# Patient Record
Sex: MALE | Race: WHITE | NOT HISPANIC OR LATINO | Employment: OTHER | URBAN - METROPOLITAN AREA
[De-identification: names, ages, dates, MRNs, and addresses within clinical notes are randomized per-mention and may not be internally consistent; named-entity substitution may affect disease eponyms.]

---

## 2021-02-22 ENCOUNTER — OFFICE VISIT (OUTPATIENT)
Dept: ORTHOPEDIC SURGERY | Facility: CLINIC | Age: 51
End: 2021-02-22

## 2021-02-22 VITALS — HEIGHT: 74 IN | WEIGHT: 315 LBS | HEART RATE: 69 BPM | BODY MASS INDEX: 40.43 KG/M2 | OXYGEN SATURATION: 98 %

## 2021-02-22 DIAGNOSIS — M75.32 CALCIFIC TENDINITIS OF LEFT SHOULDER: ICD-10-CM

## 2021-02-22 DIAGNOSIS — M25.512 LEFT SHOULDER PAIN, UNSPECIFIED CHRONICITY: Primary | ICD-10-CM

## 2021-02-22 DIAGNOSIS — M75.82 ROTATOR CUFF TENDINITIS, LEFT: ICD-10-CM

## 2021-02-22 DIAGNOSIS — E11.9 TYPE 2 DIABETES MELLITUS WITHOUT COMPLICATION, WITHOUT LONG-TERM CURRENT USE OF INSULIN (HCC): ICD-10-CM

## 2021-02-22 PROCEDURE — 99203 OFFICE O/P NEW LOW 30 MIN: CPT | Performed by: PHYSICIAN ASSISTANT

## 2021-02-22 PROCEDURE — 20610 DRAIN/INJ JOINT/BURSA W/O US: CPT | Performed by: PHYSICIAN ASSISTANT

## 2021-02-22 RX ORDER — METOPROLOL SUCCINATE 25 MG/1
TABLET, EXTENDED RELEASE ORAL
COMMUNITY
Start: 2021-02-02

## 2021-02-22 RX ORDER — LEVOTHYROXINE SODIUM 0.03 MG/1
TABLET ORAL
COMMUNITY
Start: 2021-02-02

## 2021-02-22 RX ORDER — LEVOTHYROXINE SODIUM 0.2 MG/1
TABLET ORAL
COMMUNITY
Start: 2021-02-02

## 2021-02-22 RX ORDER — INSULIN DEGLUDEC 200 U/ML
INJECTION, SOLUTION SUBCUTANEOUS
COMMUNITY
Start: 2021-02-10

## 2021-02-22 RX ORDER — ERGOCALCIFEROL 1.25 MG/1
CAPSULE ORAL
COMMUNITY
Start: 2021-02-10

## 2021-02-22 RX ORDER — SERTRALINE HYDROCHLORIDE 100 MG/1
TABLET, FILM COATED ORAL
COMMUNITY
Start: 2021-02-02

## 2021-02-22 RX ORDER — CYCLOBENZAPRINE HCL 10 MG
TABLET ORAL
COMMUNITY
Start: 2021-02-18

## 2021-02-22 RX ORDER — OMEPRAZOLE 40 MG/1
CAPSULE, DELAYED RELEASE ORAL
COMMUNITY
Start: 2021-02-02

## 2021-02-22 RX ADMIN — LIDOCAINE HYDROCHLORIDE 4 ML: 10 INJECTION, SOLUTION EPIDURAL; INFILTRATION; INTRACAUDAL; PERINEURAL at 08:40

## 2021-02-22 RX ADMIN — TRIAMCINOLONE ACETONIDE 40 MG: 40 INJECTION, SUSPENSION INTRA-ARTICULAR; INTRAMUSCULAR at 08:40

## 2021-02-22 NOTE — PROGRESS NOTES
[facility] Note    Subjective     Chief Complaint   Patient presents with   • Left Shoulder - Pain        HPI  José Manuel Paige is a 50 y.o. male.  Left-hand-dominant.  Patient presents for evaluation of his left shoulder.  No reported specific recent history of injury or trauma but he is noted increasing pain over the last 6 weeks.  He reports being run over by a cows last year but never had any significant pain to the shoulder following this.    Currently he endorses a pain scale of 5/10.  Severity the pain moderate.  Quality pain aching.  Pain causing problems with regards to sleep.  He has difficulty with reaching and lifting activities.  No reported numbness or tingling into the extremity or digits.  No treatment today.    Patient is a diabetic.    Patient denies any fever, chills, night sweats or other constitutional symptoms.    Past Medical History:   Diagnosis Date   • Acid reflux    • Depression    • Diabetes (CMS/HCC)    • Hypertension    • Thyroid disease       Past Surgical History:   Procedure Laterality Date   • GASTRIC SLEEVE LAPAROSCOPIC     • HERNIA REPAIR        Family History   Problem Relation Age of Onset   • Diabetes Mother    • Heart attack Mother    • No Known Problems Father      Social History     Socioeconomic History   • Marital status:      Spouse name: Not on file   • Number of children: Not on file   • Years of education: Not on file   • Highest education level: Not on file   Tobacco Use   • Smoking status: Never Smoker   • Smokeless tobacco: Never Used   Substance and Sexual Activity   • Alcohol use: Not Currently   • Drug use: Never   • Sexual activity: Defer      Current Outpatient Medications on File Prior to Visit   Medication Sig Dispense Refill   • cyclobenzaprine (FLEXERIL) 10 MG tablet      • levothyroxine (SYNTHROID, LEVOTHROID) 200 MCG tablet      • levothyroxine (SYNTHROID, LEVOTHROID) 25 MCG tablet      • metFORMIN (GLUCOPHAGE) 1000 MG tablet  "     • metoprolol succinate XL (TOPROL-XL) 25 MG 24 hr tablet      • omeprazole (priLOSEC) 40 MG capsule      • sertraline (ZOLOFT) 100 MG tablet      • Tresiba FlexTouch 200 UNIT/ML solution pen-injector pen injection      • vitamin D (ERGOCALCIFEROL) 1.25 MG (89407 UT) capsule capsule        No current facility-administered medications on file prior to visit.       Allergies   Allergen Reactions   • Penicillins Hives        The following portions of the patient's history were reviewed and updated as appropriate: allergies, current medications, past family history, past medical history, past social history, past surgical history and problem list.    Review of Systems   Constitutional: Negative.    HENT: Negative.    Eyes: Negative.    Respiratory: Negative.    Cardiovascular: Negative.    Gastrointestinal: Negative.    Endocrine: Negative.    Genitourinary: Negative.    Musculoskeletal: Positive for arthralgias, back pain, joint swelling and neck stiffness.   Skin: Negative.    Allergic/Immunologic: Negative.    Neurological: Negative.    Hematological: Negative.    Psychiatric/Behavioral: Negative.         Objective      Physical Exam  Pulse 69   Ht 188 cm (74\")   Wt (!) 144 kg (317 lb)   SpO2 98%   BMI 40.70 kg/m²     Body mass index is 40.7 kg/m².    GENERAL APPEARANCE: awake, alert & oriented x 3, in no acute distress and well developed, well nourished  PSYCH: normal mood and affect  LUNGS:  breathing nonlabored, no wheezing  EYES: sclera anicteric, pupils equal  CARDIOVASCULAR: palpable pulses. Capillary refill less than 2 seconds  INTEGUMENTARY: skin intact, no clubbing, cyanosis  NEUROLOGIC:  Normal Sensation         Ortho Exam  Musculoskeletal   Upper Extremity   Left Shoulder     Inspection and Palpation:     Tenderness -positive tenderness anterior lateral aspect of the shoulder.  Patient also has tenderness noted to the ACJ.    Crepitus - none    Sensation is normal    Examination reveals no " ecchymosis.      Strength and Tone:    Supraspinatus - 4/5    External Rotators-5/5    Infraspinatus - 5/5    Subscapularis - 4+/5    Deltoid - 5/5     Range of Motion   Right shoulder:    Internal Rotation: ROM - T10    External Rotation: AROM - 80 degrees    Elevation through flexion: AROM - 180 degrees    Left Shoulder:    Internal Rotation: ROM - T12    External Rotation: AROM - 65 degrees    Elevation through flexion: AROM - 160 degrees      Instability   Left shoulder    Sulcus sign negative    Apprehension test negative    Yesenia relocation test negative    Jerk test negative     Impingement   Left shoulder    Pena-Dale impingement test positive    Neer impingement test positive     Functional Testing   Left shoulder    AC crossover adduction test mild discomfort    Abdominal compression test mild discomfort    Lift-off sign negative    Speed's test negative    Sharma's test negative    Hornblower's sign negative       Imaging/Studies  Ordered left shoulder plain films.  Imaging read by Dr. Nieves.    Imaging Results (Last 7 Days)     Procedure Component Value Units Date/Time    XR Shoulder 2+ View Left [[id number]] Resulted: 02/22/21 0836     Updated: 02/22/21 0837    Narrative:      Left Shoulder X-Ray  Indication: Pain  AP, scapular Y, and axillary lateral views    Findings: Calcific tissue in the supraspinatus insertion area of the   greater tuberosity  No fracture  Normal joint spaces    No prior studies were available for comparison.        Laboratory data  Patient states he has an A1c of 9-10.  I do not have official results in his chart or paperwork that he brought with him.  Assessment/Plan        ICD-10-CM ICD-9-CM   1. Left shoulder pain, unspecified chronicity  M25.512 719.41   2. Rotator cuff tendinitis, left  M75.82 726.10   3. Calcific tendinitis of left shoulder  M75.32 726.11   4. Type 2 diabetes mellitus without complication, without long-term current use of insulin (CMS/[facility])  E11.9  250.00       Orders Placed This Encounter   Procedures   • Large Joint Arthrocentesis: L subacromial bursa   • XR Shoulder 2+ View Left        -Left shoulder pain due to rotator cuff tendinitis/calcific tendinitis.  -Patient was offered and accepted corticosteroid injection to the subacromial space.  Injection was given today.  -Discussed referral to formal PT but he states he is unable to go.  He was provided home shoulder exercises.  -Type II diabetic--patient needs to work with PCP regarding better control/tighter control of his diabetes as he reports his A1c is 9-10.  -Recommend over-the-counter pain medication as needed.  -Follow-up in 6 weeks for repeat evaluation.  -Questions and concerns answered.    After discussing the risks, benefits, indications of injection, the patient gave consent to proceed.  Left shoulder subacromial space was confirmed as the correct site to be injected with a timeout.  It was then prepped using Hibiclens and injected with a mixture of 4 cc of 1% plain lidocaine and 1 cc of Kenalog (40 mg per mL), without any resistance through the posterior lateral approach, patient in seated position.  Area was cleaned, hemostasis was achieved and a Band-Aid was applied over the injection site.  The patient tolerated procedure well.  I instructed the patient on signs and symptoms of infection.  They should report to the emergency department or return to clinic if any of these develop, for further evaluation and treatment.  Recommended modifying activity for the next 48 hours to include rest, ice, elevation and oral pain medication as needed.  Postinjection pain control as well as blood sugar monitoring were discussed with the patient.    History, exam and imaging all discussed with Dr. Nieves who agrees with the above assessment and plan.    Medical Decision Making  Management Options : over-the-counter medicine and prescription/IM medicine  Data/Risk: radiology tests       Lisa Pandya,  MARGOTH  02/26/21  08:41 EST         EMR Dragon/Transcription disclaimer:  Much of this encounter note is an electronic transcription of spoken language to printed text. Electronic transcription of spoken language may permit erroneous, or at times, nonsensical words or phrases to be inadvertently transcribed. Although I have reviewed the note for such errors, some may still exist.

## 2021-02-22 NOTE — PROGRESS NOTES
Procedure   Large Joint Arthrocentesis: L subacromial bursa  Date/Time: 2/22/2021 8:40 AM  Consent given by: patient  Site marked: site marked  Timeout: Immediately prior to procedure a time out was called to verify the correct patient, procedure, equipment, support staff and site/side marked as required   Supporting Documentation  Indications: pain   Procedure Details  Location: shoulder - L subacromial bursa  Preparation: Patient was prepped and draped in the usual sterile fashion  Needle size: 25 G  Approach: anterior  Medications administered: 40 mg triamcinolone acetonide 40 MG/ML; 4 mL lidocaine PF 1% 1 %  Patient tolerance: patient tolerated the procedure well with no immediate complications

## 2021-02-26 RX ORDER — TRIAMCINOLONE ACETONIDE 40 MG/ML
40 INJECTION, SUSPENSION INTRA-ARTICULAR; INTRAMUSCULAR
Status: COMPLETED | OUTPATIENT
Start: 2021-02-22 | End: 2021-02-22

## 2021-02-26 RX ORDER — LIDOCAINE HYDROCHLORIDE 10 MG/ML
4 INJECTION, SOLUTION EPIDURAL; INFILTRATION; INTRACAUDAL; PERINEURAL
Status: COMPLETED | OUTPATIENT
Start: 2021-02-22 | End: 2021-02-22

## 2021-04-08 ENCOUNTER — OFFICE VISIT (OUTPATIENT)
Dept: ORTHOPEDIC SURGERY | Facility: CLINIC | Age: 51
End: 2021-04-08

## 2021-04-08 VITALS
BODY MASS INDEX: 40.43 KG/M2 | HEART RATE: 69 BPM | WEIGHT: 315 LBS | SYSTOLIC BLOOD PRESSURE: 150 MMHG | DIASTOLIC BLOOD PRESSURE: 81 MMHG | HEIGHT: 74 IN

## 2021-04-08 DIAGNOSIS — E66.01 CLASS 3 SEVERE OBESITY WITH BODY MASS INDEX (BMI) OF 40.0 TO 44.9 IN ADULT, UNSPECIFIED OBESITY TYPE, UNSPECIFIED WHETHER SERIOUS COMORBIDITY PRESENT (HCC): ICD-10-CM

## 2021-04-08 DIAGNOSIS — E11.9 TYPE 2 DIABETES MELLITUS WITHOUT COMPLICATION, WITHOUT LONG-TERM CURRENT USE OF INSULIN (HCC): ICD-10-CM

## 2021-04-08 DIAGNOSIS — M75.82 ROTATOR CUFF TENDINITIS, LEFT: Primary | ICD-10-CM

## 2021-04-08 DIAGNOSIS — M75.32 CALCIFIC TENDINITIS OF LEFT SHOULDER: ICD-10-CM

## 2021-04-08 PROCEDURE — 99213 OFFICE O/P EST LOW 20 MIN: CPT | Performed by: PHYSICIAN ASSISTANT

## 2021-04-08 NOTE — PROGRESS NOTES
"    [facility] Orthopaedic Surgery Clinic Note        Subjective     CC: Follow-up (6 weeks- Left shoulder pain)      SHERYL Paige is a 51 y.o. male.  Established patient returns for follow-up left shoulder following corticosteroid injection 2/22/2021 to the subacromial space.  He reports improvement of symptoms.  He states he is able to sleep on his left side now.  With the injection he believes he got approximately 80% of pain relief.    Currently he endorses a pain scale of 3/10.  He reports he has been doing his home exercise shoulder therapy, intermittently.  He does have a history of diabetes and states he just had lab work and he believes his A1c is around 8.??.    Overall, patient's symptoms are improved.  He states that this time the pain is tolerable.    Additionally patient does have a BMI of 40.43.  He has had a history of gastric sleeve 4 to 5 years ago and reports that he has lost a total of 263 pounds since having that surgery performed.  He is continue to work on weight loss.    ROS:    Constiutional:Pt denies fever, chills, nausea, or vomiting.  MSK:as above        Objective      Past Medical History  Past Medical History:   Diagnosis Date   • Acid reflux    • Depression    • Diabetes (CMS/HCC)    • Hypertension    • Thyroid disease          Physical Exam  /81   Pulse 69   Ht 188 cm (74.02\")   Wt (!) 143 kg (315 lb)   BMI 40.43 kg/m²     Body mass index is 40.43 kg/m².    Patient is well nourished and well developed.        Ortho Exam  Peripheral Vascular   Left Upper Extremity    No cyanotic nail beds    Pink nail beds and rapid capillary refill   Palpation    Radial Pulse - Bilaterally normal    Musculoskeletal   Upper Extremity   Left Shoulder     Inspection and Palpation:    Sensation is normal     Strength and Tone:    Supraspinatus - 4/5    Infraspinatus - 5/5    Biceps - 5/5    Subscapularis-4+/5        Patient continues to have tenderness especially to the lateral aspect of the " shoulder.      AC joint tenderness:  Mild      Range of Motion   Left Shoulder:    Internal Rotation: ROM - T12    External Rotation: AROM - 65 degrees    Elevation through flexion: AROM - 170-180 degrees      Impingement   Left shoulder    Pena-Dale impingement test mild discomfort    Neer impingement test mild discomfort   Functional Testing   Left shoulder    Abdominal compression test mild discomfort    Speed's test negative    Sharma's test negative      Imaging/Labs/EMG Reviewed:  No new imaging today.      Assessment:  1. Rotator cuff tendinitis, left    2. Calcific tendinitis of left shoulder    3. Type 2 diabetes mellitus without complication, without long-term current use of insulin (CMS/[facility])    4. Class 3 severe obesity with body mass index (BMI) of 40.0 to 44.9 in adult, unspecified obesity type, unspecified whether serious comorbidity present (CMS/[facility])        Plan:  1. Left shoulder rotator cuff tendinitis with calcific tendinitis--is too soon to repeat corticosteroid injection but patient states that pain that he is having at this time is tolerable and he does not require repeat injection.  2. He is still uninterested in proceeding with formal PT.  He will continue with his home shoulder exercises for range of motion, stretching and strengthening.  If he changes his mind regarding formal PT referral he can contact the clinic.  3. Recommend over-the-counter pain medication as needed.  4. Diabetes--patient reports most recent A1c was a little over 8.0.  He needs to continue working with his PCP on tighter glucose control.  5. Obesity--patient has a BMI of 40.43.  Patient has an elevated BMI. The patient has been instructed on various weight loss avenues including diet, portion control, calorie restriction, low/no impact exercise, referral to weight loss management and/or bariatric surgery (which he is undergone gastric sleeve for 5 years ago).  It was explained that weight loss can improve joint  pain alone by decreasing the joint reaction forces.  For every pound of weight change, the knee and hip joints see a 4 to 5 fold change in pressure.  We will continue working on weight loss on his own with portion control.    6. Follow-up in 3 months for repeat evaluation, sooner if issues arise or symptoms worsen/change.  Plan on how he is doing we could possibly repeat the corticosteroid injection to the subacromial space versus injecting a different location in the shoulder if it is more symptomatic.  We also discussed proceeding with MRI for further evaluation.  7. Questions and concerns answered.      Lisa Pandya PA-C  04/08/21  08:24 MADHURIT      Dragon disclaimer:  Much of this encounter note is an electronic transcription/translation of spoken language to printed text. The electronic translation of spoken language may permit erroneous, or at times, nonsensical words or phrases to be inadvertently transcribed; Although I have reviewed the note for such errors, some may still exist.

## 2021-04-12 ENCOUNTER — TELEPHONE (OUTPATIENT)
Dept: ORTHOPEDIC SURGERY | Facility: CLINIC | Age: 51
End: 2021-04-12

## 2021-04-12 NOTE — TELEPHONE ENCOUNTER
Best call back number: [phone]    How would you like to receive the form or medical records (pick-up, mail, fax): FAX  If fax, what is the fax number: [phone]    Additional notes: PLEASE FAX OFFICE NOTES FROM 4/8 TO THIS NUMBER ABOVE

## 2024-04-17 ENCOUNTER — HOSPITAL ENCOUNTER (OUTPATIENT)
Age: 54
End: 2024-04-17
Payer: MEDICARE

## 2024-04-17 VITALS
DIASTOLIC BLOOD PRESSURE: 75 MMHG | HEART RATE: 65 BPM | SYSTOLIC BLOOD PRESSURE: 129 MMHG | OXYGEN SATURATION: 94 % | RESPIRATION RATE: 18 BRPM

## 2024-04-17 VITALS — BODY MASS INDEX: 41.5 KG/M2

## 2024-04-17 DIAGNOSIS — M79.605: ICD-10-CM

## 2024-04-17 DIAGNOSIS — M54.50: Primary | ICD-10-CM

## 2024-04-17 DIAGNOSIS — M54.16: ICD-10-CM

## 2024-04-17 DIAGNOSIS — G89.4: ICD-10-CM

## 2024-04-17 PROCEDURE — 72110 X-RAY EXAM L-2 SPINE 4/>VWS: CPT

## 2024-04-17 PROCEDURE — 99202 OFFICE O/P NEW SF 15 MIN: CPT

## 2024-04-17 PROCEDURE — G0463 HOSPITAL OUTPT CLINIC VISIT: HCPCS

## 2024-05-01 ENCOUNTER — HOSPITAL ENCOUNTER (OUTPATIENT)
Age: 54
Discharge: HOME | End: 2024-05-01
Payer: MEDICARE

## 2024-05-01 DIAGNOSIS — M54.50: Primary | ICD-10-CM

## 2024-05-01 PROCEDURE — 76376 3D RENDER W/INTRP POSTPROCES: CPT

## 2024-05-01 PROCEDURE — 72148 MRI LUMBAR SPINE W/O DYE: CPT

## 2024-05-16 ENCOUNTER — HOSPITAL ENCOUNTER (OUTPATIENT)
Dept: HOSPITAL 22 - SC.PAIN | Age: 54
Discharge: HOME | End: 2024-05-16
Payer: MEDICARE

## 2024-05-16 VITALS
DIASTOLIC BLOOD PRESSURE: 54 MMHG | HEART RATE: 61 BPM | RESPIRATION RATE: 18 BRPM | SYSTOLIC BLOOD PRESSURE: 115 MMHG | OXYGEN SATURATION: 98 %

## 2024-05-16 VITALS — BODY MASS INDEX: 43.6 KG/M2

## 2024-05-16 DIAGNOSIS — M51.16: Primary | ICD-10-CM

## 2024-05-16 DIAGNOSIS — M79.605: ICD-10-CM

## 2024-05-16 PROCEDURE — G0463 HOSPITAL OUTPT CLINIC VISIT: HCPCS

## 2024-05-16 PROCEDURE — 99212 OFFICE O/P EST SF 10 MIN: CPT

## 2024-06-04 ENCOUNTER — HOSPITAL ENCOUNTER (OUTPATIENT)
Age: 54
Discharge: HOME | End: 2024-06-04
Payer: MEDICARE

## 2024-06-04 VITALS
SYSTOLIC BLOOD PRESSURE: 104 MMHG | TEMPERATURE: 98.06 F | OXYGEN SATURATION: 97 % | DIASTOLIC BLOOD PRESSURE: 60 MMHG | HEART RATE: 67 BPM | RESPIRATION RATE: 18 BRPM

## 2024-06-04 VITALS
HEART RATE: 74 BPM | RESPIRATION RATE: 18 BRPM | DIASTOLIC BLOOD PRESSURE: 57 MMHG | SYSTOLIC BLOOD PRESSURE: 131 MMHG | OXYGEN SATURATION: 93 %

## 2024-06-04 VITALS — BODY MASS INDEX: 41.5 KG/M2

## 2024-06-04 VITALS
DIASTOLIC BLOOD PRESSURE: 78 MMHG | HEART RATE: 74 BPM | SYSTOLIC BLOOD PRESSURE: 131 MMHG | RESPIRATION RATE: 18 BRPM | OXYGEN SATURATION: 93 %

## 2024-06-04 DIAGNOSIS — M51.26: ICD-10-CM

## 2024-06-04 DIAGNOSIS — M51.16: Primary | ICD-10-CM

## 2024-06-04 PROCEDURE — 64483 NJX AA&/STRD TFRM EPI L/S 1: CPT

## 2024-06-19 ENCOUNTER — HOSPITAL ENCOUNTER (OUTPATIENT)
Dept: HOSPITAL 22 - SC.PAIN | Age: 54
Discharge: HOME | End: 2024-06-19
Payer: MEDICARE

## 2024-06-19 VITALS
OXYGEN SATURATION: 95 % | HEART RATE: 61 BPM | DIASTOLIC BLOOD PRESSURE: 64 MMHG | RESPIRATION RATE: 18 BRPM | SYSTOLIC BLOOD PRESSURE: 124 MMHG

## 2024-06-19 VITALS — BODY MASS INDEX: 41.5 KG/M2

## 2024-06-19 DIAGNOSIS — M51.16: Primary | ICD-10-CM

## 2024-06-19 DIAGNOSIS — M79.605: ICD-10-CM

## 2024-06-19 PROCEDURE — 99212 OFFICE O/P EST SF 10 MIN: CPT

## 2024-06-19 PROCEDURE — G0463 HOSPITAL OUTPT CLINIC VISIT: HCPCS

## 2024-08-19 ENCOUNTER — HOSPITAL ENCOUNTER (OUTPATIENT)
Age: 54
Discharge: HOME | End: 2024-08-19
Payer: MEDICARE

## 2024-08-19 VITALS — BODY MASS INDEX: 41.7 KG/M2

## 2024-08-19 VITALS
SYSTOLIC BLOOD PRESSURE: 139 MMHG | OXYGEN SATURATION: 97 % | DIASTOLIC BLOOD PRESSURE: 70 MMHG | HEART RATE: 72 BPM | RESPIRATION RATE: 18 BRPM

## 2024-08-19 DIAGNOSIS — I25.10: ICD-10-CM

## 2024-08-19 DIAGNOSIS — Z79.899: ICD-10-CM

## 2024-08-19 DIAGNOSIS — M54.50: ICD-10-CM

## 2024-08-19 DIAGNOSIS — M51.16: ICD-10-CM

## 2024-08-19 DIAGNOSIS — G89.29: ICD-10-CM

## 2024-08-19 DIAGNOSIS — I10: ICD-10-CM

## 2024-08-19 PROCEDURE — G0463 HOSPITAL OUTPT CLINIC VISIT: HCPCS

## 2024-08-19 PROCEDURE — 99212 OFFICE O/P EST SF 10 MIN: CPT

## 2025-02-21 NOTE — PROGRESS NOTES
Chief Complaint  Establish care for sleep apnea    Subjective     History of Present Illness:  José Manuel Paige is a 54 y.o. male with hypertension, diabetes mellitus, thyroid disease, and depression.  The patient is referred by Liya Mcadams MD with primary care.  Patient presents to establish care for JAEL on PAP therapy.  Review of self-reported questionnaire notes symptoms including snoring, witnessed apnea, daytime sleepiness and fatigue, frequent awakening, morning headaches, nonrestorative sleep, heartburn/reflux, dry mouth, sore throat, and memory loss.  He typically goes to bed at 8 PM waking at 4 AM on weekdays and weekends.  He denies use of tobacco, alcohol, or illicit/recreational drugs.  Patient's significant other reports witnessed episodes of apnea which occur occasionally.  Additionally, the patient is observed with heavy snoring which occurs continuously, nightly, and in all sleeping positions.     He has been having difficulty with his mask leaking. He had tried several masks but continues to have issues. He has had dry mouth and throat.  Patient inquires about the inspire device.    DME: [facility]    Further details are as follows:    Bellwood Scale is (out of 24): Total score: 13     Estimated average amount of sleep per night: 8 hours  Number of times he wakes up at night: 5-6 times  Difficulty falling back asleep: occasionally  It usually takes 0 minutes to go to sleep.  He feels sleepy upon waking up: yes  Rotating or night shift work: no    Drowsiness/Sleepiness:  He exhibits the following:  excessive daytime sleepiness  excessive daytime fatigue  falls asleep watching TV  difficulty driving due to sleepiness  takes scheduled naps during the day    Snoring/Breathing:  He exhibits the following:  loud snoring, quits breathing at night, awakens with dry mouth, and sore throat when waking up in the morning    Head Injury:  He exhibits the following:  No    Reflux:  He describes the following:  Not  waking with     Narcolepsy:  He exhibits the following:  none    RLS/PLMs:  He describes the following:  discomfort in legs with an urge to move them    Insomnia:  He describes the following:  frequent awakenings  bothered by pain at night    Parasomnia:  He exhibits the following:  sleep talks  excessive sweating while sleeping    Weight:       02/26/25 0824   Weight: (!) 152 kg (334 lb 11.2 oz)      Weight change in the last year:  gain: 0 lbs    The patient's relevant past medical, surgical, family, and social history reviewed and updated in Epic as appropriate.    Review of Systems   Constitutional: Negative.    HENT:  Positive for nosebleeds and sore throat.    Eyes: Negative.    Respiratory: Negative.     Cardiovascular: Negative.    Gastrointestinal: Negative.    Endocrine: Negative.    Genitourinary: Negative.    Musculoskeletal:  Positive for back pain, myalgias and neck stiffness.   Skin:  Positive for rash.   Allergic/Immunologic: Negative.    Neurological:  Positive for dizziness, tremors, light-headedness and headache.   Hematological:  Bruises/bleeds easily.   Psychiatric/Behavioral: Negative.     All other systems reviewed and are negative.      PMH:    Past Medical History:   Diagnosis Date    Acid reflux     Depression     Diabetes     Hypertension     Thyroid disease      Past Surgical History:   Procedure Laterality Date    GASTRIC SLEEVE LAPAROSCOPIC      HERNIA REPAIR         Allergies   Allergen Reactions    Penicillins Hives       MEDS:  Prior to Admission medications    Medication Sig Start Date End Date Taking? Authorizing Provider   cyclobenzaprine (FLEXERIL) 10 MG tablet  2/18/21   Eduardo Arellano MD   levothyroxine (SYNTHROID, LEVOTHROID) 200 MCG tablet  2/2/21   Eduardo Arellano MD   levothyroxine (SYNTHROID, LEVOTHROID) 25 MCG tablet  2/2/21   Eduardo Arellano MD   metFORMIN (GLUCOPHAGE) 1000 MG tablet  2/2/21   Eduardo Arellano MD   metoprolol succinate XL  "(TOPROL-XL) 25 MG 24 hr tablet  2/2/21   Eduardo Arellano MD   omeprazole (priLOSEC) 40 MG capsule  2/2/21   Eduardo Arellano MD   sertraline (ZOLOFT) 100 MG tablet  2/2/21   Eduardo Arellano MD   Tresiba FlexTouch 200 UNIT/ML solution pen-injector pen injection  2/10/21   Eduardo Arellano MD   vitamin D (ERGOCALCIFEROL) 1.25 MG (13435 UT) capsule capsule  2/10/21   Eduardo Arellano MD       FH:  Family History   Problem Relation Age of Onset    Diabetes Mother     Heart attack Mother     No Known Problems Father        Objective   Vital Signs:  /70 (BP Location: Left arm, Patient Position: Sitting, Cuff Size: Adult)   Pulse 68   Temp 97.7 °F (36.5 °C) (Temporal)   Ht 188 cm (74.02\")   Wt (!) 152 kg (334 lb 11.2 oz)   SpO2 96%   BMI 42.95 kg/m²     Patient's (Body mass index is 42.95 kg/m².) indicates that they are morbidly obese (BMI > 40 or > 35 with obesity - related health condition) with health related conditions that include obstructive sleep apnea, hypertension, coronary heart disease, and diabetes mellitus . Weight is unchanged. BMI is is above average; BMI management plan is completed. We discussed portion control and increasing exercise. Works on a farm.           Physical Exam  Vitals reviewed.   Constitutional:       Appearance: Normal appearance.   HENT:      Head: Normocephalic and atraumatic.      Nose: Nose normal.      Mouth/Throat:      Mouth: Mucous membranes are moist.   Cardiovascular:      Rate and Rhythm: Normal rate and regular rhythm.      Heart sounds: No murmur heard.     No friction rub. No gallop.   Pulmonary:      Effort: Pulmonary effort is normal. No respiratory distress.      Breath sounds: Normal breath sounds. No wheezing or rhonchi.   Neurological:      Mental Status: He is alert and oriented to person, place, and time.   Psychiatric:         Behavior: Behavior normal.       Mallampati Score: IV (only hard palate visible)      Result Review " :           PAP Report:  AHI: 0.8/h  Days of Usage: 55/90 (61%)  Number of Days Greater than 4 hours: 36/90 (40%)  Average time (days used): 5 hours 7 minutes  95th Percentile Pressure: 9.5 cmH2O  95th percentile leaks: 17.0 L/min  Settings: Auto CPAP-5/20 cm H2O, EPR off, response standard         Assessment and Plan  José Manuel Paige is a 54 y.o. male with hypertension, diabetes mellitus, thyroid disease, and depression who presents to establish care for JAEL on PAP therapy.  He is having ongoing difficulty with his tolerance of PAP therapy.  The Pap report has been reviewed.  Overall usage is at 61% with compliance at 40%.  Patient averages 5 hours and 7 minutes of therapy.  Sleep apnea is well-controlled with an AHI of 0.8/h.  A sleep study obtained in 2022 revealed mild obstructive sleep apnea with an AHI of 11.7/h.     Patient has had ongoing difficulty with hypersomnolence. He has had a diagnosis of sleep apnea for over 10-12 years. He has for quite some time now had difficulty with mask leakage and overall struggling with PAP therapy.  We had a discussion about the consequences of uncontrolled sleep apnea.  Patient is aware that this needs to be treated, and is particularly concerned about his daytime hypersomnia.  He has difficulty with mask leakage, as well as breathing with the device.      At this point I have recommended a titration study for further evaluation.  Patient may need BiPAP as opposed to CPAP.  I will place orders for supplies for now.  I discussed alternatives to PAP therapy including MAD, and surgical consult.  Presently, the patient would not qualify for inspire device consult given his weight, nor his initial index of 11.7/h. I have discussed weight loss as it pertains to obstructive sleep apnea.    Diagnoses and all orders for this visit:    1. Sleep apnea, unspecified type (Primary)  -     PAP Therapy  -     Polysomnography 4 or More Parameters With Titration; Future    2. Snoring  -      Polysomnography 4 or More Parameters With Titration; Future    3. Excessive daytime sleepiness  -     Polysomnography 4 or More Parameters With Titration; Future    4. Hypersomnia with sleep apnea  -     Polysomnography 4 or More Parameters With Titration; Future    5. Morbid (severe) obesity due to excess calories                 I discussed the consequences of uncontrolled sleep apnea including hypertension, heart disease, diabetes, stroke, and dementia. I further discussed sleep apnea therapeutic options including CPAP, Weight loss, Oral dental appliance, and surgery.         Follow Up  Return for Follow up after study.  Patient was given instructions and counseling regarding his condition or for health maintenance advice. Please see specific information pulled into the AVS if appropriate.     CHYNA Lyman, [facility] Center[facility]-BC  Pulmonology, Critical Care, and Sleep Medicine

## 2025-02-26 ENCOUNTER — PATIENT ROUNDING (BHMG ONLY) (OUTPATIENT)
Dept: SLEEP MEDICINE | Age: 55
End: 2025-02-26
Payer: MEDICARE

## 2025-02-26 ENCOUNTER — OFFICE VISIT (OUTPATIENT)
Dept: SLEEP MEDICINE | Age: 55
End: 2025-02-26
Payer: MEDICARE

## 2025-02-26 VITALS
SYSTOLIC BLOOD PRESSURE: 150 MMHG | BODY MASS INDEX: 40.43 KG/M2 | WEIGHT: 315 LBS | HEIGHT: 74 IN | DIASTOLIC BLOOD PRESSURE: 70 MMHG | HEART RATE: 68 BPM | OXYGEN SATURATION: 96 % | TEMPERATURE: 97.7 F

## 2025-02-26 DIAGNOSIS — G47.30 SLEEP APNEA, UNSPECIFIED TYPE: Primary | ICD-10-CM

## 2025-02-26 DIAGNOSIS — G47.10 HYPERSOMNIA WITH SLEEP APNEA: ICD-10-CM

## 2025-02-26 DIAGNOSIS — R06.83 SNORING: ICD-10-CM

## 2025-02-26 DIAGNOSIS — G47.19 EXCESSIVE DAYTIME SLEEPINESS: ICD-10-CM

## 2025-02-26 DIAGNOSIS — E66.01 MORBID (SEVERE) OBESITY DUE TO EXCESS CALORIES: ICD-10-CM

## 2025-02-26 DIAGNOSIS — G47.30 HYPERSOMNIA WITH SLEEP APNEA: ICD-10-CM

## 2025-02-26 NOTE — PROGRESS NOTES
February 26, 2025    Hello, may I speak with José Manuel Paige?    My name is Stefania      I am  with [facility]  [address] [address] [phone].    Before we get started may I verify your date of birth? 1970    I am calling to officially welcome you to our practice and ask about your recent visit. Is this a good time to talk? yes    Tell me about your visit with us. What things went well?  Good visit no problems felt very welcome       We're always looking for ways to make our patients' experiences even better. Do you have recommendations on ways we may improve?  no    Overall were you satisfied with your first visit to our practice? yes       I appreciate you taking the time to speak with me today. Is there anything else I can do for you? no      Thank you, and have a great day.

## 2025-05-21 ENCOUNTER — HOSPITAL ENCOUNTER (OUTPATIENT)
Dept: SLEEP MEDICINE | Facility: HOSPITAL | Age: 55
Discharge: HOME | End: 2025-05-21
Admitting: NURSE PRACTITIONER
Payer: MEDICARE

## 2025-05-21 VITALS
BODY MASS INDEX: 40.43 KG/M2 | OXYGEN SATURATION: 95 % | WEIGHT: 315 LBS | SYSTOLIC BLOOD PRESSURE: 159 MMHG | HEIGHT: 74 IN | DIASTOLIC BLOOD PRESSURE: 73 MMHG | HEART RATE: 76 BPM

## 2025-05-21 DIAGNOSIS — R06.83 SNORING: ICD-10-CM

## 2025-05-21 DIAGNOSIS — G47.10 HYPERSOMNIA WITH SLEEP APNEA: ICD-10-CM

## 2025-05-21 DIAGNOSIS — G47.30 HYPERSOMNIA WITH SLEEP APNEA: ICD-10-CM

## 2025-05-21 DIAGNOSIS — G47.30 SLEEP APNEA, UNSPECIFIED TYPE: ICD-10-CM

## 2025-05-21 DIAGNOSIS — G47.19 EXCESSIVE DAYTIME SLEEPINESS: ICD-10-CM

## 2025-05-21 PROCEDURE — 95811 POLYSOM 6/>YRS CPAP 4/> PARM: CPT

## 2025-05-29 ENCOUNTER — TELEPHONE (OUTPATIENT)
Dept: SLEEP MEDICINE | Age: 55
End: 2025-05-29
Payer: MEDICARE

## 2025-05-29 NOTE — TELEPHONE ENCOUNTER
Called patient reference his sleep study results, patient is understanding. Schedule patient for a follow up appointment.

## 2025-08-07 ENCOUNTER — OFFICE VISIT (OUTPATIENT)
Dept: SLEEP MEDICINE | Age: 55
End: 2025-08-07
Payer: MEDICARE

## 2025-08-07 VITALS
HEIGHT: 74 IN | HEART RATE: 67 BPM | SYSTOLIC BLOOD PRESSURE: 130 MMHG | BODY MASS INDEX: 40.43 KG/M2 | OXYGEN SATURATION: 95 % | TEMPERATURE: 97.3 F | DIASTOLIC BLOOD PRESSURE: 70 MMHG | WEIGHT: 315 LBS

## 2025-08-07 DIAGNOSIS — G47.33 OBSTRUCTIVE SLEEP APNEA, ADULT: Primary | ICD-10-CM

## 2025-08-07 DIAGNOSIS — E66.01 MORBID (SEVERE) OBESITY DUE TO EXCESS CALORIES: ICD-10-CM

## 2025-08-07 DIAGNOSIS — G47.30 HYPERSOMNIA WITH SLEEP APNEA: ICD-10-CM

## 2025-08-07 DIAGNOSIS — G47.10 HYPERSOMNIA WITH SLEEP APNEA: ICD-10-CM
